# Patient Record
(demographics unavailable — no encounter records)

---

## 2025-05-05 NOTE — DISCUSSION/SUMMARY
[FreeTextEntry1] : Pap smear is performed.Prescription for mammogram was provided.  She will follow-up annually.  Patient will keep an eye on her menses and if they are heavy or she has intermenstrual bleeding she will advise me.  We also discussed her elevated blood pressure.  Patient tells me that she is sometimes forgetful and taking her medication.  I discussed long-term risks of untreated hypertension including renal issues strokes, aneurysms etc.  I encouraged her to follow-up with her PCP

## 2025-05-05 NOTE — PHYSICAL EXAM
[Chaperoned Physical Exam] : A chaperone was present in the examining room during all aspects of the physical examination. [MA] : MA [FreeTextEntry2] : rachelle pereyra [Appropriately responsive] : appropriately responsive [Alert] : alert [No Acute Distress] : no acute distress [No Lymphadenopathy] : no lymphadenopathy [Regular Rate Rhythm] : regular rate rhythm [No Murmurs] : no murmurs [Clear to Auscultation B/L] : clear to auscultation bilaterally [Soft] : soft [Non-tender] : non-tender [Non-distended] : non-distended [No HSM] : No HSM [No Lesions] : no lesions [No Mass] : no mass [Oriented x3] : oriented x3 [Examination Of The Breasts] : a normal appearance [No Masses] : no breast masses were palpable [Labia Majora] : normal [Labia Minora] : normal [Polyp ___ cm] : [unfilled] ~[facility] polyp [Normal] : normal [Uterine Adnexae] : normal